# Patient Record
Sex: MALE | Race: WHITE | NOT HISPANIC OR LATINO | Employment: OTHER | URBAN - METROPOLITAN AREA
[De-identification: names, ages, dates, MRNs, and addresses within clinical notes are randomized per-mention and may not be internally consistent; named-entity substitution may affect disease eponyms.]

---

## 2021-03-24 ENCOUNTER — OFFICE VISIT (OUTPATIENT)
Dept: FAMILY MEDICINE CLINIC | Facility: CLINIC | Age: 56
End: 2021-03-24

## 2021-03-24 VITALS
WEIGHT: 184.8 LBS | OXYGEN SATURATION: 98 % | SYSTOLIC BLOOD PRESSURE: 112 MMHG | DIASTOLIC BLOOD PRESSURE: 58 MMHG | RESPIRATION RATE: 16 BRPM | BODY MASS INDEX: 25.03 KG/M2 | HEIGHT: 72 IN | TEMPERATURE: 98 F | HEART RATE: 84 BPM

## 2021-03-24 DIAGNOSIS — Z00.00 WELL ADULT EXAM: Primary | ICD-10-CM

## 2021-03-24 DIAGNOSIS — H61.91 SKIN LESION OF RIGHT EAR: ICD-10-CM

## 2021-03-24 DIAGNOSIS — Z12.5 SCREENING FOR PROSTATE CANCER: ICD-10-CM

## 2021-03-24 DIAGNOSIS — B07.8 COMMON WART: ICD-10-CM

## 2021-03-24 LAB
ALBUMIN SERPL-MCNC: 4.6 G/DL (ref 3.5–5.2)
ALBUMIN/GLOB SERPL: 1.8 G/DL
ALP SERPL-CCNC: 58 U/L (ref 39–117)
ALT SERPL W P-5'-P-CCNC: 16 U/L (ref 1–41)
ANION GAP SERPL CALCULATED.3IONS-SCNC: 8.8 MMOL/L (ref 5–15)
AST SERPL-CCNC: 18 U/L (ref 1–40)
BASOPHILS # BLD AUTO: 0.03 10*3/MM3 (ref 0–0.2)
BASOPHILS NFR BLD AUTO: 0.4 % (ref 0–1.5)
BILIRUB SERPL-MCNC: 0.4 MG/DL (ref 0–1.2)
BUN SERPL-MCNC: 13 MG/DL (ref 6–20)
BUN/CREAT SERPL: 11.1 (ref 7–25)
CALCIUM SPEC-SCNC: 9.5 MG/DL (ref 8.6–10.5)
CHLORIDE SERPL-SCNC: 104 MMOL/L (ref 98–107)
CHOLEST SERPL-MCNC: 154 MG/DL (ref 0–200)
CO2 SERPL-SCNC: 26.2 MMOL/L (ref 22–29)
CREAT SERPL-MCNC: 1.17 MG/DL (ref 0.76–1.27)
DEPRECATED RDW RBC AUTO: 41.9 FL (ref 37–54)
EOSINOPHIL # BLD AUTO: 0.06 10*3/MM3 (ref 0–0.4)
EOSINOPHIL NFR BLD AUTO: 0.9 % (ref 0.3–6.2)
ERYTHROCYTE [DISTWIDTH] IN BLOOD BY AUTOMATED COUNT: 12.7 % (ref 12.3–15.4)
GFR SERPL CREATININE-BSD FRML MDRD: 65 ML/MIN/1.73
GLOBULIN UR ELPH-MCNC: 2.5 GM/DL
GLUCOSE SERPL-MCNC: 86 MG/DL (ref 65–99)
HCT VFR BLD AUTO: 45.2 % (ref 37.5–51)
HCV AB SER DONR QL: NORMAL
HDLC SERPL-MCNC: 46 MG/DL (ref 40–60)
HGB BLD-MCNC: 15.4 G/DL (ref 13–17.7)
IMM GRANULOCYTES # BLD AUTO: 0.01 10*3/MM3 (ref 0–0.05)
IMM GRANULOCYTES NFR BLD AUTO: 0.1 % (ref 0–0.5)
LDLC SERPL CALC-MCNC: 83 MG/DL (ref 0–100)
LDLC/HDLC SERPL: 1.73 {RATIO}
LYMPHOCYTES # BLD AUTO: 0.95 10*3/MM3 (ref 0.7–3.1)
LYMPHOCYTES NFR BLD AUTO: 13.6 % (ref 19.6–45.3)
MCH RBC QN AUTO: 30.7 PG (ref 26.6–33)
MCHC RBC AUTO-ENTMCNC: 34.1 G/DL (ref 31.5–35.7)
MCV RBC AUTO: 90.2 FL (ref 79–97)
MONOCYTES # BLD AUTO: 0.74 10*3/MM3 (ref 0.1–0.9)
MONOCYTES NFR BLD AUTO: 10.6 % (ref 5–12)
NEUTROPHILS NFR BLD AUTO: 5.22 10*3/MM3 (ref 1.7–7)
NEUTROPHILS NFR BLD AUTO: 74.4 % (ref 42.7–76)
NRBC BLD AUTO-RTO: 0 /100 WBC (ref 0–0.2)
PLATELET # BLD AUTO: 224 10*3/MM3 (ref 140–450)
PMV BLD AUTO: 10.8 FL (ref 6–12)
POTASSIUM SERPL-SCNC: 4.1 MMOL/L (ref 3.5–5.2)
PROT SERPL-MCNC: 7.1 G/DL (ref 6–8.5)
PSA SERPL-MCNC: 1.01 NG/ML (ref 0–4)
RBC # BLD AUTO: 5.01 10*6/MM3 (ref 4.14–5.8)
SODIUM SERPL-SCNC: 139 MMOL/L (ref 136–145)
TRIGL SERPL-MCNC: 143 MG/DL (ref 0–150)
TSH SERPL DL<=0.05 MIU/L-ACNC: 1.45 UIU/ML (ref 0.27–4.2)
VLDLC SERPL-MCNC: 25 MG/DL (ref 5–40)
WBC # BLD AUTO: 7.01 10*3/MM3 (ref 3.4–10.8)

## 2021-03-24 PROCEDURE — 80061 LIPID PANEL: CPT | Performed by: FAMILY MEDICINE

## 2021-03-24 PROCEDURE — 86803 HEPATITIS C AB TEST: CPT | Performed by: FAMILY MEDICINE

## 2021-03-24 PROCEDURE — G0103 PSA SCREENING: HCPCS | Performed by: FAMILY MEDICINE

## 2021-03-24 PROCEDURE — 85025 COMPLETE CBC W/AUTO DIFF WBC: CPT | Performed by: FAMILY MEDICINE

## 2021-03-24 PROCEDURE — 80053 COMPREHEN METABOLIC PANEL: CPT | Performed by: FAMILY MEDICINE

## 2021-03-24 PROCEDURE — 99386 PREV VISIT NEW AGE 40-64: CPT | Performed by: FAMILY MEDICINE

## 2021-03-24 PROCEDURE — 83036 HEMOGLOBIN GLYCOSYLATED A1C: CPT | Performed by: FAMILY MEDICINE

## 2021-03-24 PROCEDURE — 84443 ASSAY THYROID STIM HORMONE: CPT | Performed by: FAMILY MEDICINE

## 2021-03-24 RX ORDER — BRIMONIDINE TARTRATE 0.1 %
1 DROPS OPHTHALMIC (EYE) 2 TIMES DAILY
COMMUNITY
Start: 2021-01-22

## 2021-03-24 RX ORDER — MULTIPLE VITAMINS W/ MINERALS TAB 9MG-400MCG
1 TAB ORAL DAILY
COMMUNITY

## 2021-03-24 NOTE — PROGRESS NOTES
Randell Anthony is a 55 y.o. male who presents today to establish care and complete annual exam.    Chief Complaint   Patient presents with   • Establish Care   • Ear Problem     lesion on the right ear        Patient is a generally healthy 56 yo male presenting for annual exam. Patient recently moved from [place]. His PCP there quit his practice about 10 years ago, which is the last time he's been seen by a healthcare provider. Patient states his diet is regular and generally healthy. Patient states he eats a light and healthy breakfast and dinner with a heavier lunch. 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 No sleep problems. No history of anxiety or depression. Patient uses alphagan eye drops to prevent glaucoma. Patient sees the optometrist annually and was told to use these daily. Patient states it's been a few years since he's seen the dermatologist. Patient states he had a normal colonoscopy at age 51. Patient states he is not up to date with his vaccinations including TDAP and Zoster.  Patient states he has received the COVID vaccines with his second shot earlier this week. Patient denies having any blood work recently. Patient denies any OTC medications.    Patient noticed a dry spot on his right ear that he states he noticed about a year ago. Patient states he thinks it has increased in size over time, but doesn't really pay much attention to the area. Patient describes it as scaly. Patient denies history of precancerous lesions. Patient denies ear auricle pain.    Patient states he has had a wart on the third finger of his right hand for many years. Patient states he has tried over the counter medication to freeze it, as well as taping it. Patient says freezing it will reduce the size, but it always comes back.        Review of Systems   Constitutional: Negative for chills, fatigue and fever.   HENT: Negative for ear discharge and ear  pain.    Respiratory: Negative for cough and shortness of breath.    Cardiovascular: Negative for chest pain and palpitations.   Skin: Positive for skin lesions (right ear auricle).        PHQ-9 Depression Screening  Little interest or pleasure in doing things? 0   Feeling down, depressed, or hopeless? 0   Trouble falling or staying asleep, or sleeping too much?     Feeling tired or having little energy?     Poor appetite or overeating?     Feeling bad about yourself - or that you are a failure or have let yourself or your family down?     Trouble concentrating on things, such as reading the newspaper or watching television?     Moving or speaking so slowly that other people could have noticed? Or the opposite - being so fidgety or restless that you have been moving around a lot more than usual?     Thoughts that you would be better off dead, or of hurting yourself in some way?     PHQ-9 Total Score 0   If you checked off any problems, how difficult have these problems made it for you to do your work, take care of things at home, or get along with other people?         History reviewed. No pertinent past medical history.     Past Surgical History:   Procedure Laterality Date   • COLONOSCOPY  2018   • INGUINAL HERNIA REPAIR          Family History   Problem Relation Age of Onset   • Lung disease Mother         mesothelioma   • Other Father         unknown   • No Known Problems Sister    • No Known Problems Brother    • No Known Problems Son    • Diabetes Maternal Grandmother    • Other Maternal Grandfather         unknown   • Other Paternal Grandmother         unknown   • Liver disease Paternal Grandfather    • No Known Problems Sister    • No Known Problems Brother    • No Known Problems Son    • No Known Problems Son         Social History     Socioeconomic History   • Marital status:      Spouse name: Not on file   • Number of children: Not on file   • Years of education: Not on file   • Highest education  "level: Not on file   Tobacco Use   • Smoking status: Never Smoker   • Smokeless tobacco: Former User     Types: Chew   Vaping Use   • Vaping Use: Never used   Substance and Sexual Activity   • Alcohol use: Yes     Comment: occasional   • Drug use: Not Currently   • Sexual activity: Yes     Partners: Female     Birth control/protection: Tubal ligation     Comment: wife only        Current Outpatient Medications on File Prior to Visit   Medication Sig Dispense Refill   • Alphagan P 0.1 % solution ophthalmic solution Administer 1 drop to both eyes 2 (Two) Times a Day.     • multivitamin with minerals tablet tablet Take 1 tablet by mouth Daily.       No current facility-administered medications on file prior to visit.       No Known Allergies     Visit Vitals  /58 (BP Location: Left arm, Patient Position: Sitting, Cuff Size: Adult)   Pulse 84   Temp 98 °F (36.7 °C) (Temporal)   Resp 16   Ht 182.4 cm (71.8\")   Wt 83.8 kg (184 lb 12.8 oz)   SpO2 98%   BMI 25.20 kg/m²      Body mass index is 25.2 kg/m².    Physical Exam  Constitutional:       General: He is not in acute distress.     Appearance: He is well-developed. He is not ill-appearing.   HENT:      Head: Normocephalic and atraumatic.        Right Ear: Hearing normal.      Left Ear: Hearing normal.      Nose: Nose normal.   Eyes:      General: No scleral icterus.        Right eye: No discharge.         Left eye: No discharge.      Pupils: Pupils are equal, round, and reactive to light.   Neck:      Thyroid: No thyromegaly.      Vascular: No JVD.      Trachea: No tracheal deviation.   Cardiovascular:      Rate and Rhythm: Normal rate and regular rhythm.      Heart sounds: Normal heart sounds. No murmur heard.   No friction rub. No gallop.    Pulmonary:      Effort: Pulmonary effort is normal. No respiratory distress.      Breath sounds: Normal breath sounds. No stridor. No wheezing or rales.   Abdominal:      General: Bowel sounds are normal. There is no " distension.      Palpations: Abdomen is soft. There is no mass.      Tenderness: There is no abdominal tenderness. There is no guarding or rebound.      Hernia: No hernia is present.   Musculoskeletal:         General: No tenderness. Normal range of motion.      Cervical back: Normal range of motion and neck supple.   Lymphadenopathy:      Cervical: No cervical adenopathy.   Skin:     General: Skin is warm and dry.      Findings: No rash.          Neurological:      Mental Status: He is alert and oriented to person, place, and time.      Cranial Nerves: No cranial nerve deficit.      Motor: No abnormal muscle tone.      Coordination: Coordination normal.   Psychiatric:         Behavior: Behavior normal.          No results found for this or any previous visit.     Problems Addressed this Visit        ENT    Skin lesion of right ear       Health Encounters    Well adult exam - Primary     The patient is here for health maintenance visit.  Currently, the patient consumes a healthy diet and has an adequate exercise regimen.  Screening lab work is ordered.  Immunizations were reviewed today.  Advice and education was given regarding nutrition, aerobic exercise, routine dental evaluations, routine eye exams, reproductive health, cardiovascular risk reduction, sunscreen use, self skin examination (annual dermatology evaluations) and seatbelt use (general overall safety).  Further recommendations will be given if needed after lab evaluation.  Annual wellness evaluation is recommended.              Skin    Common wart      Diagnoses       Codes Comments    Well adult exam    -  Primary ICD-10-CM: Z00.00  ICD-9-CM: V70.0     Common wart     ICD-10-CM: B07.8  ICD-9-CM: 078.19     Skin lesion of right ear     ICD-10-CM: H61.91  ICD-9-CM: 380.9           Return in about 1 year (around 3/24/2022) for Annual.    Parts of this office note have been dictated by voice recognition software. Grammatical and/or spelling errors may be  present.     Tee Ann MD  3/24/2021

## 2021-03-25 LAB — HBA1C MFR BLD: 5.9 % (ref 4.8–5.6)

## 2025-05-29 ENCOUNTER — OFFICE VISIT (OUTPATIENT)
Dept: FAMILY MEDICINE CLINIC | Facility: CLINIC | Age: 60
End: 2025-05-29
Payer: COMMERCIAL

## 2025-05-29 VITALS
SYSTOLIC BLOOD PRESSURE: 116 MMHG | BODY MASS INDEX: 26.4 KG/M2 | WEIGHT: 188.6 LBS | OXYGEN SATURATION: 97 % | DIASTOLIC BLOOD PRESSURE: 76 MMHG | TEMPERATURE: 98 F | HEART RATE: 84 BPM | HEIGHT: 71 IN

## 2025-05-29 DIAGNOSIS — H66.001 NON-RECURRENT ACUTE SUPPURATIVE OTITIS MEDIA OF RIGHT EAR WITHOUT SPONTANEOUS RUPTURE OF TYMPANIC MEMBRANE: Primary | ICD-10-CM

## 2025-05-29 NOTE — PROGRESS NOTES
Randell Anthony is a 59 y.o. male who presents today for Ear Drainage      HPI     Patient has a history of ear infections. He had an ear infection in his right ear in the spring. He has not been having pain in the ears and no tinnitus. He has been having drainage from the right ear since end of Feb or beginning of March. He sees it a lot on his pillow in the morning but does seen much during the day. He has been using q-tips to clean his ears in the morning. He states the drainage is dark reddish brown in color. He thinks he has had blood from the ear a couple of times. He has some muffled hearing on the right side as well.     Review of Systems   Constitutional:  Negative for fever and unexpected weight loss.   HENT:  Positive for ear discharge and hearing loss. Negative for congestion, ear pain, postnasal drip, rhinorrhea, sinus pressure, sore throat and tinnitus.    Eyes:  Negative for visual disturbance.   Respiratory:  Negative for cough, shortness of breath and wheezing.    Cardiovascular:  Negative for chest pain and palpitations.   Gastrointestinal:  Negative for abdominal pain, blood in stool, constipation, diarrhea, nausea, vomiting and GERD.   Endocrine: Negative for polydipsia and polyuria.   Genitourinary:  Negative for difficulty urinating.   Musculoskeletal:  Negative for joint swelling.   Skin:  Negative for rash and skin lesions.   Allergic/Immunologic: Negative for environmental allergies.   Neurological:  Negative for dizziness, seizures, syncope, light-headedness and headache.   Hematological:  Does not bruise/bleed easily.   Psychiatric/Behavioral:  Negative for suicidal ideas.         The following portions of the patient's history were reviewed and updated as appropriate: allergies, current medications, past family history, past medical history, past social history, past surgical history and problem list.    Current Outpatient Medications on File Prior to Visit   Medication Sig Dispense Refill     "multivitamin with minerals tablet tablet Take 1 tablet by mouth Daily.      [DISCONTINUED] Alphagan P 0.1 % solution ophthalmic solution Administer 1 drop to both eyes 2 (Two) Times a Day. (Patient not taking: Reported on 5/29/2025)      [DISCONTINUED] azithromycin (Zithromax Z-Brijesh) 250 MG tablet Take 2 tablets by mouth on day 1, then 1 tablet daily on days 2-5 (Patient not taking: Reported on 5/29/2025) 6 tablet 0    [DISCONTINUED] methylPREDNISolone (MEDROL) 4 MG dose pack Take as directed on package instructions. (Patient not taking: Reported on 5/29/2025) 21 tablet 0    [DISCONTINUED] ondansetron ODT (ZOFRAN-ODT) 4 MG disintegrating tablet Take 1 tablet by mouth Every 6 (Six) Hours As Needed for Nausea or Vomiting. (Patient not taking: Reported on 5/29/2025) 30 tablet 0     No current facility-administered medications on file prior to visit.       No Known Allergies     Visit Vitals  /76 (BP Location: Left arm, Patient Position: Sitting, Cuff Size: Adult)   Pulse 84   Temp 98 °F (36.7 °C) (Infrared)   Ht 180.3 cm (71\")   Wt 85.5 kg (188 lb 9.6 oz)   SpO2 97%   BMI 26.30 kg/m²        Physical Exam  Constitutional:       General: He is not in acute distress.     Appearance: He is well-developed. He is not diaphoretic.   HENT:      Head: Atraumatic.      Right Ear: No drainage or tenderness. A middle ear effusion is present. There is no impacted cerumen. No foreign body. No mastoid tenderness. Tympanic membrane is scarred, erythematous and bulging. Tympanic membrane is not injected, perforated or retracted.      Left Ear: No drainage or tenderness.  No middle ear effusion. There is no impacted cerumen. No foreign body. No mastoid tenderness. No PE tube. No hemotympanum. Tympanic membrane is not injected, scarred, perforated, erythematous, retracted or bulging.   Cardiovascular:      Rate and Rhythm: Normal rate and regular rhythm.      Heart sounds: Normal heart sounds. No murmur heard.     No friction rub. " No gallop.   Pulmonary:      Effort: Pulmonary effort is normal. No respiratory distress.      Breath sounds: Normal breath sounds. No stridor. No wheezing, rhonchi or rales.   Musculoskeletal:      Cervical back: Normal range of motion and neck supple.   Skin:     General: Skin is warm and dry.   Neurological:      Mental Status: He is alert and oriented to person, place, and time.   Psychiatric:         Behavior: Behavior normal.          Results for orders placed or performed during the hospital encounter of 12/19/24   Covid-19 + Flu A&B AG, Veritor    Collection Time: 12/19/24  8:47 AM    Specimen: Swab   Result Value Ref Range    SARS Antigen Not Detected Not Detected, Presumptive Negative    Influenza A Antigen LATASHA Not Detected Not Detected    Influenza B Antigen LATASHA Not Detected Not Detected    Internal Control Passed Passed    Lot Number [id number]     Expiration Date 10,[id number]         Problems Addressed this Visit          ENT    Non-recurrent acute suppurative otitis media of right ear without spontaneous rupture of tympanic membrane - Primary    Signs symptoms concerning for acute otitis media.    I suspect the drainage the patient has been having is likely cerumen based off the description he is given me.  The blood he describes also sounds like dark-colored cerumen.  On exam there are some dark dried cerumen in the ear canal.  Middle ear effusion with erythematous bulging tympanic membrane. Will treat with Augmentin.  Patient request ENT referral which was placed.  RTC/ED precautions given.         Relevant Medications    amoxicillin-clavulanate (AUGMENTIN) 875-125 MG per tablet    Other Relevant Orders    Ambulatory Referral to ENT (Otolaryngology)     Diagnoses         Codes Comments      Non-recurrent acute suppurative otitis media of right ear without spontaneous rupture of tympanic membrane    -  Primary ICD-10-CM: H66.001  ICD-9-CM: 382.00             Return in about 3 months (around 8/29/2025)  for Annual or sooner if needed.    Tee Ann MD   5/29/2025

## 2025-05-29 NOTE — ASSESSMENT & PLAN NOTE
Signs symptoms concerning for acute otitis media.    I suspect the drainage the patient has been having is likely cerumen based off the description he is given me.  The blood he describes also sounds like dark-colored cerumen.  On exam there are some dark dried cerumen in the ear canal.  Middle ear effusion with erythematous bulging tympanic membrane. Will treat with Augmentin.  Patient request ENT referral which was placed.  RTC/ED precautions given.

## 2025-08-26 ENCOUNTER — LAB (OUTPATIENT)
Dept: LAB | Facility: HOSPITAL | Age: 60
End: 2025-08-26
Payer: COMMERCIAL

## 2025-08-26 ENCOUNTER — OFFICE VISIT (OUTPATIENT)
Dept: FAMILY MEDICINE CLINIC | Facility: CLINIC | Age: 60
End: 2025-08-26
Payer: COMMERCIAL

## 2025-08-26 VITALS
TEMPERATURE: 98 F | HEART RATE: 71 BPM | BODY MASS INDEX: 26.68 KG/M2 | OXYGEN SATURATION: 95 % | WEIGHT: 190.6 LBS | HEIGHT: 71 IN | DIASTOLIC BLOOD PRESSURE: 70 MMHG | SYSTOLIC BLOOD PRESSURE: 122 MMHG

## 2025-08-26 DIAGNOSIS — Z00.00 WELL ADULT EXAM: ICD-10-CM

## 2025-08-26 DIAGNOSIS — Z12.5 PROSTATE CANCER SCREENING: ICD-10-CM

## 2025-08-26 DIAGNOSIS — Z00.00 WELL ADULT EXAM: Primary | ICD-10-CM

## 2025-08-26 DIAGNOSIS — Z23 IMMUNIZATION DUE: ICD-10-CM

## 2025-08-26 DIAGNOSIS — Z12.11 COLON CANCER SCREENING: ICD-10-CM

## 2025-08-26 PROBLEM — H66.001 NON-RECURRENT ACUTE SUPPURATIVE OTITIS MEDIA OF RIGHT EAR WITHOUT SPONTANEOUS RUPTURE OF TYMPANIC MEMBRANE: Status: RESOLVED | Noted: 2025-05-29 | Resolved: 2025-08-26

## 2025-08-26 PROCEDURE — 83036 HEMOGLOBIN GLYCOSYLATED A1C: CPT

## 2025-08-26 PROCEDURE — 80053 COMPREHEN METABOLIC PANEL: CPT

## 2025-08-26 PROCEDURE — 80061 LIPID PANEL: CPT

## 2025-08-26 PROCEDURE — G0103 PSA SCREENING: HCPCS

## 2025-08-26 PROCEDURE — 85025 COMPLETE CBC W/AUTO DIFF WBC: CPT

## 2025-08-27 LAB
ALBUMIN SERPL-MCNC: 4.4 G/DL (ref 3.5–5.2)
ALBUMIN/GLOB SERPL: 1.5 G/DL
ALP SERPL-CCNC: 59 U/L (ref 39–117)
ALT SERPL W P-5'-P-CCNC: 26 U/L (ref 1–41)
ANION GAP SERPL CALCULATED.3IONS-SCNC: 13.9 MMOL/L (ref 5–15)
AST SERPL-CCNC: 25 U/L (ref 1–40)
BASOPHILS # BLD AUTO: 0.04 10*3/MM3 (ref 0–0.2)
BASOPHILS NFR BLD AUTO: 0.5 % (ref 0–1.5)
BILIRUB SERPL-MCNC: 0.3 MG/DL (ref 0–1.2)
BUN SERPL-MCNC: 18 MG/DL (ref 6–20)
BUN/CREAT SERPL: 12.9 (ref 7–25)
CALCIUM SPEC-SCNC: 9.8 MG/DL (ref 8.6–10.5)
CHLORIDE SERPL-SCNC: 99 MMOL/L (ref 98–107)
CHOLEST SERPL-MCNC: 223 MG/DL (ref 0–200)
CO2 SERPL-SCNC: 26.1 MMOL/L (ref 22–29)
CREAT SERPL-MCNC: 1.39 MG/DL (ref 0.76–1.27)
DEPRECATED RDW RBC AUTO: 45.2 FL (ref 37–54)
EGFRCR SERPLBLD CKD-EPI 2021: 58.8 ML/MIN/1.73
EOSINOPHIL # BLD AUTO: 0.12 10*3/MM3 (ref 0–0.4)
EOSINOPHIL NFR BLD AUTO: 1.6 % (ref 0.3–6.2)
ERYTHROCYTE [DISTWIDTH] IN BLOOD BY AUTOMATED COUNT: 13.2 % (ref 12.3–15.4)
GLOBULIN UR ELPH-MCNC: 2.9 GM/DL
GLUCOSE SERPL-MCNC: 76 MG/DL (ref 65–99)
HBA1C MFR BLD: 6.3 % (ref 4.8–5.6)
HCT VFR BLD AUTO: 46.4 % (ref 37.5–51)
HDLC SERPL-MCNC: 38 MG/DL (ref 40–60)
HGB BLD-MCNC: 15.9 G/DL (ref 13–17.7)
IMM GRANULOCYTES # BLD AUTO: 0.02 10*3/MM3 (ref 0–0.05)
IMM GRANULOCYTES NFR BLD AUTO: 0.3 % (ref 0–0.5)
LDLC SERPL CALC-MCNC: 99 MG/DL (ref 0–100)
LDLC/HDLC SERPL: 2.16 {RATIO}
LYMPHOCYTES # BLD AUTO: 1.69 10*3/MM3 (ref 0.7–3.1)
LYMPHOCYTES NFR BLD AUTO: 22.1 % (ref 19.6–45.3)
MCH RBC QN AUTO: 31.7 PG (ref 26.6–33)
MCHC RBC AUTO-ENTMCNC: 34.3 G/DL (ref 31.5–35.7)
MCV RBC AUTO: 92.6 FL (ref 79–97)
MONOCYTES # BLD AUTO: 0.71 10*3/MM3 (ref 0.1–0.9)
MONOCYTES NFR BLD AUTO: 9.3 % (ref 5–12)
NEUTROPHILS NFR BLD AUTO: 5.07 10*3/MM3 (ref 1.7–7)
NEUTROPHILS NFR BLD AUTO: 66.2 % (ref 42.7–76)
NRBC BLD AUTO-RTO: 0 /100 WBC (ref 0–0.2)
PLATELET # BLD AUTO: 234 10*3/MM3 (ref 140–450)
PMV BLD AUTO: 10.4 FL (ref 6–12)
POTASSIUM SERPL-SCNC: 4.1 MMOL/L (ref 3.5–5.2)
PROT SERPL-MCNC: 7.3 G/DL (ref 6–8.5)
PSA SERPL-MCNC: 1.75 NG/ML (ref 0–4)
RBC # BLD AUTO: 5.01 10*6/MM3 (ref 4.14–5.8)
SODIUM SERPL-SCNC: 139 MMOL/L (ref 136–145)
TRIGL SERPL-MCNC: 514 MG/DL (ref 0–150)
VLDLC SERPL-MCNC: 86 MG/DL (ref 5–40)
WBC NRBC COR # BLD AUTO: 7.65 10*3/MM3 (ref 3.4–10.8)

## 2025-08-28 ENCOUNTER — PREP FOR SURGERY (OUTPATIENT)
Dept: OTHER | Facility: HOSPITAL | Age: 60
End: 2025-08-28
Payer: COMMERCIAL

## 2025-08-28 DIAGNOSIS — Z12.11 SCREENING FOR COLON CANCER: Primary | ICD-10-CM
